# Patient Record
Sex: FEMALE | Race: BLACK OR AFRICAN AMERICAN | Employment: OTHER | URBAN - METROPOLITAN AREA
[De-identification: names, ages, dates, MRNs, and addresses within clinical notes are randomized per-mention and may not be internally consistent; named-entity substitution may affect disease eponyms.]

---

## 2021-08-19 ENCOUNTER — CLINICAL SUPPORT (OUTPATIENT)
Dept: URGENT CARE | Facility: CLINIC | Age: 81
End: 2021-08-19
Payer: MEDICARE

## 2021-08-19 DIAGNOSIS — Z20.822 EXPOSURE TO COVID-19 VIRUS: Primary | ICD-10-CM

## 2021-08-19 LAB
CTP QC/QA: YES
SARS-COV-2 RDRP RESP QL NAA+PROBE: NEGATIVE

## 2021-08-19 PROCEDURE — U0002: ICD-10-PCS | Mod: QW,S$GLB,, | Performed by: EMERGENCY MEDICINE

## 2021-08-19 PROCEDURE — U0002 COVID-19 LAB TEST NON-CDC: HCPCS | Mod: QW,S$GLB,, | Performed by: EMERGENCY MEDICINE

## 2022-03-16 ENCOUNTER — TELEPHONE (OUTPATIENT)
Dept: NEUROSURGERY | Facility: CLINIC | Age: 82
End: 2022-03-16
Payer: MEDICARE

## 2022-03-16 NOTE — TELEPHONE ENCOUNTER
----- Message from Rossy Crespo sent at 3/16/2022 10:42 AM CDT -----  Contact: pt  Pt requesting call back from staff regarding scheduling..     Confirmed contact info below:  Contact Name: Keke Watts  Phone Number: [phone] or

## 2022-07-26 ENCOUNTER — TELEPHONE (OUTPATIENT)
Dept: ENDOCRINOLOGY | Facility: CLINIC | Age: 82
End: 2022-07-26
Payer: MEDICARE

## 2022-11-11 ENCOUNTER — TELEPHONE (OUTPATIENT)
Dept: ENDOCRINOLOGY | Facility: CLINIC | Age: 82
End: 2022-11-11
Payer: MEDICARE

## 2022-11-18 ENCOUNTER — TELEPHONE (OUTPATIENT)
Dept: ENDOCRINOLOGY | Facility: CLINIC | Age: 82
End: 2022-11-18
Payer: MEDICARE

## 2022-11-18 NOTE — TELEPHONE ENCOUNTER
----- Message from Chiara Patel sent at 11/18/2022  2:45 PM CST -----  Regarding: Reschedule 11/18 appt  Contact: Silvia fink ([phone]  Caller Daughter is requesting a call back regarding scheduling appt for pt thyroid   Attempt to reschedule No available in epic Please call to discuss further .

## 2023-02-01 ENCOUNTER — LAB VISIT (OUTPATIENT)
Dept: LAB | Facility: OTHER | Age: 83
End: 2023-02-01
Attending: INTERNAL MEDICINE
Payer: MEDICARE

## 2023-02-01 ENCOUNTER — OFFICE VISIT (OUTPATIENT)
Dept: ENDOCRINOLOGY | Facility: CLINIC | Age: 83
End: 2023-02-01
Payer: MEDICARE

## 2023-02-01 VITALS
WEIGHT: 121.25 LBS | HEART RATE: 69 BPM | BODY MASS INDEX: 24.44 KG/M2 | HEIGHT: 59 IN | DIASTOLIC BLOOD PRESSURE: 76 MMHG | OXYGEN SATURATION: 100 % | SYSTOLIC BLOOD PRESSURE: 164 MMHG

## 2023-02-01 DIAGNOSIS — E04.2 MULTIPLE THYROID NODULES: Primary | ICD-10-CM

## 2023-02-01 DIAGNOSIS — M81.0 AGE-RELATED OSTEOPOROSIS WITHOUT CURRENT PATHOLOGICAL FRACTURE: ICD-10-CM

## 2023-02-01 DIAGNOSIS — E04.2 MULTIPLE THYROID NODULES: ICD-10-CM

## 2023-02-01 LAB
25(OH)D3+25(OH)D2 SERPL-MCNC: 32 NG/ML (ref 30–96)
ALBUMIN SERPL BCP-MCNC: 4 G/DL (ref 3.5–5.2)
ALP SERPL-CCNC: 77 U/L (ref 55–135)
ALT SERPL W/O P-5'-P-CCNC: 9 U/L (ref 10–44)
ANION GAP SERPL CALC-SCNC: 6 MMOL/L (ref 8–16)
AST SERPL-CCNC: 15 U/L (ref 10–40)
BILIRUB SERPL-MCNC: 0.6 MG/DL (ref 0.1–1)
BUN SERPL-MCNC: 9 MG/DL (ref 8–23)
CALCIUM SERPL-MCNC: 9.5 MG/DL (ref 8.7–10.5)
CHLORIDE SERPL-SCNC: 105 MMOL/L (ref 95–110)
CO2 SERPL-SCNC: 32 MMOL/L (ref 23–29)
CREAT SERPL-MCNC: 0.7 MG/DL (ref 0.5–1.4)
EST. GFR  (NO RACE VARIABLE): >60 ML/MIN/1.73 M^2
GLUCOSE SERPL-MCNC: 95 MG/DL (ref 70–110)
POTASSIUM SERPL-SCNC: 3.6 MMOL/L (ref 3.5–5.1)
PROT SERPL-MCNC: 7.7 G/DL (ref 6–8.4)
SODIUM SERPL-SCNC: 143 MMOL/L (ref 136–145)
T4 FREE SERPL-MCNC: 0.98 NG/DL (ref 0.71–1.51)
TSH SERPL DL<=0.005 MIU/L-ACNC: 2.09 UIU/ML (ref 0.4–4)

## 2023-02-01 PROCEDURE — 3078F DIAST BP <80 MM HG: CPT | Mod: CPTII,S$GLB,, | Performed by: INTERNAL MEDICINE

## 2023-02-01 PROCEDURE — 1159F PR MEDICATION LIST DOCUMENTED IN MEDICAL RECORD: ICD-10-PCS | Mod: CPTII,S$GLB,, | Performed by: INTERNAL MEDICINE

## 2023-02-01 PROCEDURE — 1125F AMNT PAIN NOTED PAIN PRSNT: CPT | Mod: CPTII,S$GLB,, | Performed by: INTERNAL MEDICINE

## 2023-02-01 PROCEDURE — 1160F RVW MEDS BY RX/DR IN RCRD: CPT | Mod: CPTII,S$GLB,, | Performed by: INTERNAL MEDICINE

## 2023-02-01 PROCEDURE — 3077F PR MOST RECENT SYSTOLIC BLOOD PRESSURE >= 140 MM HG: ICD-10-PCS | Mod: CPTII,S$GLB,, | Performed by: INTERNAL MEDICINE

## 2023-02-01 PROCEDURE — 80053 COMPREHEN METABOLIC PANEL: CPT | Performed by: INTERNAL MEDICINE

## 2023-02-01 PROCEDURE — 3078F PR MOST RECENT DIASTOLIC BLOOD PRESSURE < 80 MM HG: ICD-10-PCS | Mod: CPTII,S$GLB,, | Performed by: INTERNAL MEDICINE

## 2023-02-01 PROCEDURE — 1101F PT FALLS ASSESS-DOCD LE1/YR: CPT | Mod: CPTII,S$GLB,, | Performed by: INTERNAL MEDICINE

## 2023-02-01 PROCEDURE — 1101F PR PT FALLS ASSESS DOC 0-1 FALLS W/OUT INJ PAST YR: ICD-10-PCS | Mod: CPTII,S$GLB,, | Performed by: INTERNAL MEDICINE

## 2023-02-01 PROCEDURE — 99204 OFFICE O/P NEW MOD 45 MIN: CPT | Mod: S$GLB,,, | Performed by: INTERNAL MEDICINE

## 2023-02-01 PROCEDURE — 84439 ASSAY OF FREE THYROXINE: CPT | Performed by: INTERNAL MEDICINE

## 2023-02-01 PROCEDURE — 3288F PR FALLS RISK ASSESSMENT DOCUMENTED: ICD-10-PCS | Mod: CPTII,S$GLB,, | Performed by: INTERNAL MEDICINE

## 2023-02-01 PROCEDURE — 82306 VITAMIN D 25 HYDROXY: CPT | Performed by: INTERNAL MEDICINE

## 2023-02-01 PROCEDURE — 1159F MED LIST DOCD IN RCRD: CPT | Mod: CPTII,S$GLB,, | Performed by: INTERNAL MEDICINE

## 2023-02-01 PROCEDURE — 99204 PR OFFICE/OUTPT VISIT, NEW, LEVL IV, 45-59 MIN: ICD-10-PCS | Mod: S$GLB,,, | Performed by: INTERNAL MEDICINE

## 2023-02-01 PROCEDURE — 1160F PR REVIEW ALL MEDS BY PRESCRIBER/CLIN PHARMACIST DOCUMENTED: ICD-10-PCS | Mod: CPTII,S$GLB,, | Performed by: INTERNAL MEDICINE

## 2023-02-01 PROCEDURE — 36415 COLL VENOUS BLD VENIPUNCTURE: CPT | Performed by: INTERNAL MEDICINE

## 2023-02-01 PROCEDURE — 3288F FALL RISK ASSESSMENT DOCD: CPT | Mod: CPTII,S$GLB,, | Performed by: INTERNAL MEDICINE

## 2023-02-01 PROCEDURE — 1125F PR PAIN SEVERITY QUANTIFIED, PAIN PRESENT: ICD-10-PCS | Mod: CPTII,S$GLB,, | Performed by: INTERNAL MEDICINE

## 2023-02-01 PROCEDURE — 3077F SYST BP >= 140 MM HG: CPT | Mod: CPTII,S$GLB,, | Performed by: INTERNAL MEDICINE

## 2023-02-01 PROCEDURE — 84443 ASSAY THYROID STIM HORMONE: CPT | Performed by: INTERNAL MEDICINE

## 2023-02-01 NOTE — ASSESSMENT & PLAN NOTE
Seen last on US 6/2022   per report, few nodules in the 1-1.2 cm size range   no mention of TIRADs criteria, FNA.   seem likely to be TR4 or so.   in which case, repeat US after a year to re-evaluate  Can consider FNA if indicated, but would also need to verify with pt if she'd be intererested in thyroidectomy if concerning FNA results or if she'd rather avoid surgery all together regardless of outcome, in which case FNA wouldn't be needed.

## 2023-02-01 NOTE — PROGRESS NOTES
Subjective:      Chief Complaint: Thyroid Nodule and Osteoporosis      HPI: Keke Watts is a 82 y.o. female who is here for an initial evaluation for thyroid    With regards to the thyroid nodule:  The thyroid nodule was found on Imaging   MRI or US carotid, then confirmed on thyroid US    Last ultrasound: 6/28/2022. [facility]  There is a 1.2 x 0.9 x 1.1 cm well marginated round hyperechoic nodule in the left lobe.   There is a 9 mm hypoechoic nodule in the mid left lobe.     There is a 7 mm hyperechoic round nodule in the upper pole of the right lobe. There is a 5 mm calcified nodule in the right lobe inferiorly.   There is a 1.1 cm well marginated round hypoechoic nodule in the inferior right lobe.   There is a 10 mm ovoid hypoechoic nodule superior to the right lobe suggestive of lymph node    FNA done? none  Results: N/A    No results found for: TSH, FREET4, V7VOCSV, THYROPEROXID, THYROIDAB    Bones:  DXA 6/2022, osteoporosis. -3.1 left fem neck, -2.3 spine   Not on medications   No fractures    Relevant history:  No   Yes  [x]    []  FH thyroid cancer  [x]    []  Personal history of neck radiation    Relevant symptoms:  No   Yes  [x]    []  Trouble swallowing  [x]    []  Trouble breathing  [x]    []  Voice changes    []    [x]  Fatigue. Some insomnia as well  [x]    []  Constipation/diarrhea  []    [x]  Heat/Cold intolerance  [x]    []  Weight gain or weight loss  []    [x]  Palpitations or tremor    Hip pains  Nerve pains      Reviewed past medical, family, social history and updated as appropriate.    Review of Systems  As above    Objective:     Vitals:    02/01/23 0907   BP: (!) 164/76   Pulse: 69     BP Readings from Last 5 Encounters:   02/01/23 (!) 164/76   09/21/14 137/65     Physical Exam  Vitals reviewed.   Constitutional:       General: She is not in acute distress.  Neck:      Thyroid: No thyromegaly.   Cardiovascular:      Heart sounds: Normal heart sounds.   Pulmonary:      Effort:  Pulmonary effort is normal.       Wt Readings from Last 5 Encounters:   02/01/23 0907 55 kg (121 lb 4.1 oz)   09/21/14 1255 59 kg (130 lb)     No results found for: HGBA1C  No results found for: CHOL, HDL, LDLCALC, TRIG, CHOLHDL  Lab Results   Component Value Date     09/21/2014    K 3.2 (L) 09/21/2014     09/21/2014    CO2 25 09/21/2014    GLU 84 09/21/2014    BUN 8 09/21/2014    CREATININE 0.7 09/21/2014    CALCIUM 9.2 09/21/2014    ANIONGAP 9 09/21/2014    ESTGFRAFRICA >60.0 09/21/2014    EGFRNONAA >60.0 09/21/2014      No results found for: MICALBCREAT    Assessment/Plan:     Multiple thyroid nodules  Seen last on US 6/2022   per report, few nodules in the 1-1.2 cm size range   no mention of TIRADs criteria, FNA.   seem likely to be TR4 or so.   in which case, repeat US after a year to re-evaluate  Can consider FNA if indicated, but would also need to verify with pt if she'd be intererested in thyroidectomy if concerning FNA results or if she'd rather avoid surgery all together regardless of outcome, in which case FNA wouldn't be needed.      Age-related osteoporosis without current pathological fracture  Seen on DXA 6/2022   discussed with pt recommend treatment to prevent fractures  Discussed fosamax, side effects to watch out for, etc.   need labs to ensure GFR, Ca okay first.   then can start   otherwise can consider prolia if GFR too low.   or reclast if GI side effects with fosamax.   pt expressed understanding      Follow up in about 1 year (around 2/1/2024) for further monitoring, lab review, imaging review.      Lenard Oliver MD  Endocrinology

## 2023-02-01 NOTE — PATIENT INSTRUCTIONS
For thyroid, recheck ultrasound in late June/July    For bones:   Check blood test for calcium and vitamin D  Likely recommend fosamax, once a week pill.

## 2023-02-01 NOTE — ASSESSMENT & PLAN NOTE
Seen on DXA 6/2022   discussed with pt recommend treatment to prevent fractures  Discussed fosamax, side effects to watch out for, etc.   need labs to ensure GFR, Ca okay first.   then can start   otherwise can consider prolia if GFR too low.   or reclast if GI side effects with fosamax.   pt expressed understanding